# Patient Record
Sex: MALE | Race: WHITE | NOT HISPANIC OR LATINO | URBAN - METROPOLITAN AREA
[De-identification: names, ages, dates, MRNs, and addresses within clinical notes are randomized per-mention and may not be internally consistent; named-entity substitution may affect disease eponyms.]

---

## 2018-05-25 ENCOUNTER — OFFICE VISIT - HEALTHEAST (OUTPATIENT)
Dept: FAMILY MEDICINE | Facility: CLINIC | Age: 38
End: 2018-05-25

## 2018-05-25 DIAGNOSIS — E66.3 OVERWEIGHT: ICD-10-CM

## 2018-05-25 DIAGNOSIS — Z30.09 VISIT FOR VASECTOMY EVALUATION: ICD-10-CM

## 2018-05-25 DIAGNOSIS — Z00.00 ROUTINE GENERAL MEDICAL EXAMINATION AT A HEALTH CARE FACILITY: ICD-10-CM

## 2018-05-25 LAB
CHOLEST SERPL-MCNC: 179 MG/DL
FASTING STATUS PATIENT QL REPORTED: NORMAL
HBA1C MFR BLD: 5.7 % (ref 3.5–6)
HDLC SERPL-MCNC: 42 MG/DL
LDLC SERPL CALC-MCNC: 122 MG/DL
TRIGL SERPL-MCNC: 73 MG/DL

## 2018-05-25 ASSESSMENT — MIFFLIN-ST. JEOR: SCORE: 1809.84

## 2019-04-11 ENCOUNTER — RECORDS - HEALTHEAST (OUTPATIENT)
Dept: ADMINISTRATIVE | Facility: OTHER | Age: 39
End: 2019-04-11

## 2021-06-01 VITALS — WEIGHT: 197 LBS | HEIGHT: 70 IN | BODY MASS INDEX: 28.2 KG/M2

## 2021-06-16 PROBLEM — E66.3 OVERWEIGHT: Status: ACTIVE | Noted: 2018-05-25

## 2021-06-18 NOTE — PROGRESS NOTES
"[facility] Note    Patient Name: Finn Flores  Patient Age: 38 y.o.  YOB: 1980  MRN: [id number]    Date of visit: 5/25/2018    Patient Active Problem List   Diagnosis     Overweight     Social History     Social History Narrative     No narrative on file     No outpatient encounter prescriptions on file as of 5/25/2018.     No facility-administered encounter medications on file as of 5/25/2018.        Chief Complaint:   Chief Complaint   Patient presents with     Annual Exam       HPI:   Occupation:Finance  Marital status:  Number of children:2 3.5, 6m  Living situation:4  Diet:good  Exercise: less than wish  Alcohol use:2-4/week  Tobacco use:n  Illicit drug use:n  Depression:n  Last tetanus:2014  HIV testing:yes  Lipids/glucose: little high lipid.  No fam hx diabetes  Blood pressure: ok  Mother/Father/Siblings MI:no    Spirituality:Muslim  Fam hx colon cancer, breast cancer: no colon  Last visit to dentist:y  Optometrist:couple years    No urinary symptoms  6 years monogamous  Erectile dysfunction: no    Chest pain and/or shortness of breath with exertion:no            Wt Readings from Last 3 Encounters:   05/25/18 197 lb (89.4 kg)     BP Readings from Last 3 Encounters:   05/25/18 110/76       ROS: Pertinent ros findings in hpi, all other systems negative.  Objective/Physical Exam:     /76 (Patient Site: Right Arm, Patient Position: Sitting, Cuff Size: Adult Regular)  Pulse 73  Ht 5' 10\" (1.778 m)  Wt 197 lb (89.4 kg)  SpO2 98%  BMI 28.27 kg/m2    Gen: NAD, conversant, appears age, well-kempt  Skin: warm, dry, no rash, pallor cyanosis  HENT: normocephalic atraumatic, MMM, no oral lesions, otorrhea, rhinorrhea. TM's normal bilaterally.  Eyes: non-icteric, extra-ocular movements intact, PERRL, conjunctivae not injected. Holding eyes open comfortably, no drainage.  CV: NRRR no m/r/g, no peripheral edema. no JVD.  Resp: CTAB no w/r/r, normal respiratory effort  GI: soft, " non-tender, non-distended. No masses.  MSK: no muscle or joint swelling.  Neuro: no dysarthria or gross asymmetry  Psych: full affect, oriented x 3  Lymph: No significant cervical lymphadenopathy  Hematologic: No petechiae or purpura.  Normal male genitalia        Assessment/Plan:  No results found for this or any previous visit (from the past 24 hour(s)).  Encounter Diagnoses   Name Primary?     Routine general medical examination at a health care facility Yes     Visit for vasectomy evaluation      Overweight        Orders Placed This Encounter   Procedures     Lipid Cascade     Glycosylated Hemoglobin A1c     Ambulatory referral to Urology       Wt Readings from Last 3 Encounters:   05/25/18 197 lb (89.4 kg)     BP Readings from Last 3 Encounters:   05/25/18 110/76       PHQ-2 Total Score: 0 (5/25/2018  8:36 AM)  No Data Recorded    Vaccinations: tdap utd  Lab (i.e. Hiv, hepc, lipid, a1c, other std):lipid/a1c  Colon Cancer screening: n  Lung screen:n  AAA screen:n  We discussed some of the r/b for vasectomy, patient would like to visit with urologist. Referral made.      Patient Instructions   Plant Based Diet Handout    Eat abundant vegetables.    Only eat whole grains.    2-4 fruits/day.    Enjoy at least 2 servings of legumes (beans) or tofu daily.      Limit animal products such as dairy, eggs and meat. (Replace these with 1,000mcg vitamin B12 and a calcium/vitamin D supplement such as Caltrate+D3 daily.)    Avoid processed foods, sugars and oils.    Cook your own food as much as possible.    Keep a food diary and ask someone else to diet with you.    Drink 8 glasses of water a day.      exercise      Counseled patient regarding healthy lifestyle including exercise, healthy eating. I recommend seeing optometrist and dentist regularly.    Counseled patient regarding treatments, treatment options, risks and benefits and diagnosis.  The patient was interactive, attentive, verbalized understanding, and we  discussed plan.     Jairo Quick MD

## 2021-08-15 ENCOUNTER — HEALTH MAINTENANCE LETTER (OUTPATIENT)
Age: 41
End: 2021-08-15

## 2021-10-11 ENCOUNTER — HEALTH MAINTENANCE LETTER (OUTPATIENT)
Age: 41
End: 2021-10-11

## 2021-12-02 ENCOUNTER — IMMUNIZATION (OUTPATIENT)
Dept: NURSING | Facility: CLINIC | Age: 41
End: 2021-12-02
Payer: COMMERCIAL

## 2021-12-02 PROCEDURE — 0064A COVID-19,PF,MODERNA (18+ YRS BOOSTER .25ML): CPT

## 2021-12-02 PROCEDURE — 91306 COVID-19,PF,MODERNA (18+ YRS BOOSTER .25ML): CPT

## 2022-09-25 ENCOUNTER — HEALTH MAINTENANCE LETTER (OUTPATIENT)
Age: 42
End: 2022-09-25

## 2023-10-14 ENCOUNTER — HEALTH MAINTENANCE LETTER (OUTPATIENT)
Age: 43
End: 2023-10-14

## 2024-12-07 ENCOUNTER — HEALTH MAINTENANCE LETTER (OUTPATIENT)
Age: 44
End: 2024-12-07